# Patient Record
Sex: MALE | Race: WHITE | NOT HISPANIC OR LATINO | Employment: OTHER | URBAN - METROPOLITAN AREA
[De-identification: names, ages, dates, MRNs, and addresses within clinical notes are randomized per-mention and may not be internally consistent; named-entity substitution may affect disease eponyms.]

---

## 2019-08-14 ENCOUNTER — OFFICE VISIT (OUTPATIENT)
Dept: NEUROSURGERY | Facility: CLINIC | Age: 69
End: 2019-08-14

## 2019-08-14 VITALS
WEIGHT: 315 LBS | DIASTOLIC BLOOD PRESSURE: 80 MMHG | RESPIRATION RATE: 20 BRPM | HEART RATE: 80 BPM | SYSTOLIC BLOOD PRESSURE: 140 MMHG | HEIGHT: 77 IN | BODY MASS INDEX: 37.19 KG/M2

## 2019-08-14 DIAGNOSIS — M79.604 LEG PAIN, ANTERIOR, RIGHT: Primary | ICD-10-CM

## 2019-08-14 PROCEDURE — 99204 OFFICE O/P NEW MOD 45 MIN: CPT | Performed by: NEUROLOGICAL SURGERY

## 2019-08-14 RX ORDER — VENLAFAXINE HYDROCHLORIDE 75 MG/1
CAPSULE, EXTENDED RELEASE ORAL
COMMUNITY
Start: 2019-08-13

## 2019-08-14 RX ORDER — SIMVASTATIN 40 MG
TABLET ORAL
COMMUNITY
Start: 2019-06-04

## 2019-08-14 RX ORDER — NIACIN 500 MG
500 TABLET ORAL NIGHTLY
COMMUNITY

## 2019-08-14 RX ORDER — GLIPIZIDE 5 MG/1
TABLET, FILM COATED, EXTENDED RELEASE ORAL
COMMUNITY
Start: 2019-06-22

## 2019-08-14 RX ORDER — GABAPENTIN 300 MG/1
CAPSULE ORAL
Qty: 90 CAPSULE | Refills: 3 | Status: SHIPPED | OUTPATIENT
Start: 2019-08-14

## 2019-08-14 RX ORDER — ASPIRIN 81 MG/1
81 TABLET ORAL DAILY
COMMUNITY

## 2019-08-14 RX ORDER — BUSPIRONE HYDROCHLORIDE 15 MG/1
15 TABLET ORAL 2 TIMES DAILY
COMMUNITY

## 2019-08-14 RX ORDER — LOSARTAN POTASSIUM 25 MG/1
TABLET ORAL
COMMUNITY
Start: 2019-06-18

## 2019-08-14 RX ORDER — CARVEDILOL 6.25 MG/1
TABLET ORAL
COMMUNITY
Start: 2019-08-05

## 2019-08-14 RX ORDER — CLOPIDOGREL BISULFATE 75 MG/1
75 TABLET ORAL DAILY
COMMUNITY

## 2019-08-14 RX ORDER — METFORMIN HYDROCHLORIDE 500 MG/1
TABLET, EXTENDED RELEASE ORAL
COMMUNITY
Start: 2019-07-15

## 2019-08-14 NOTE — PROGRESS NOTES
Subjective   Patient ID: Jake Patel is a 68 y.o. male is being seen for consultation today at the request of Winston Lawrence MD for lumbar pain.  Pt is accompanied by his wife.    History of Present Illness 68 y fellow with a history of RLE pain excerbated by standing.  His pain starts around the knee and continues to the calf.  He reports mild LBP.  He reports exercise aggravates to some degree.  He takes tylenol 3 1x per day.  He denies weakness or falls.  No loss of b/b.  No left leg complaints.  He does has CAD and is on dual AP agents. He had a vascular  Evaluation of the legs a few years back and this was normal.  He denies JUAN's.  He has not trialed gabapentin.  He is a no smoker.  He reports his leg pain is as bad as a 6/10.   He failed chiropractic manipulation without assist.      The following portions of the patient's history were reviewed and updated as appropriate: allergies, current medications, past family history, past medical history, past social history, past surgical history and problem list.    Review of Systems   Constitutional: Negative for chills and fever.   HENT: Negative for ear pain and tinnitus.    Eyes: Negative for pain and visual disturbance.   Respiratory: Negative for shortness of breath.    Cardiovascular: Negative for palpitations.   Gastrointestinal: Negative for abdominal pain and nausea.   Genitourinary: Negative for difficulty urinating and enuresis.   Musculoskeletal: Negative for back pain (R leg to ankle).   Skin: Negative for rash.   Neurological: Negative for weakness and numbness.   Psychiatric/Behavioral: Negative for sleep disturbance.       Objective   Physical Exam   Constitutional: He is oriented to person, place, and time.   Neurological: He is oriented to person, place, and time. Gait normal.   Reflex Scores:       Tricep reflexes are 2+ on the right side and 2+ on the left side.       Bicep reflexes are 2+ on the right side and 2+ on the left side.        Brachioradialis reflexes are 2+ on the right side and 2+ on the left side.       Patellar reflexes are 2+ on the right side and 2+ on the left side.       Achilles reflexes are 1+ on the right side and 1+ on the left side.    Neurologic Exam     Mental Status   Oriented to person, place, and time.     Motor Exam   Muscle bulk: normal  Overall muscle tone: normal    Sensory Exam   Right arm light touch: normal  Left arm light touch: normal  Right leg light touch: normal  Left leg light touch: normal  Right arm pinprick: normal  Left arm pinprick: normal  Right leg pinprick: normal  Left leg pinprick: normal    Gait, Coordination, and Reflexes     Gait  Gait: normal    Reflexes   Right brachioradialis: 2+  Left brachioradialis: 2+  Right biceps: 2+  Left biceps: 2+  Right triceps: 2+  Left triceps: 2+  Right patellar: 2+  Left patellar: 2+  Right achilles: 1+  Left achilles: 1+  Right Mobley: absent  Left Mobley: absent  Right ankle clonus: absent  Left ankle clonus: absentBounding pulses in LE     No tinels at fibular head            Assessment/Plan   Independent Review of Radiographic Studies:  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  He has congenitally small spinal canal.      Medical Decision Making:  He has an interesting presentation - his pain STARTS around the knee.  He may have a peripheral process and I suggested an EMG to better vet this.  His back pathology could certainly cause this as well but the skipping of the buttock is interesting.  I will order an MRI of the knee with and without to look for a tumor.  We will start gabapentin as well.  He is leaving town and we will see him after.      Jake was seen today for back pain.    Diagnoses and all orders for this visit:    Leg pain, anterior, right  -     MRI knee right w wo contrast; Future    Other orders  -     gabapentin (NEURONTIN) 300 MG capsule; Take 1 qhs for 3-5 days, then bid for 3-5 days, then tid and stay on this dose      No  Follow-up on file.

## 2019-08-19 ENCOUNTER — TELEPHONE (OUTPATIENT)
Dept: NEUROSURGERY | Facility: CLINIC | Age: 69
End: 2019-08-19

## 2019-08-23 ENCOUNTER — TELEPHONE (OUTPATIENT)
Dept: NEUROSURGERY | Facility: CLINIC | Age: 69
End: 2019-08-23

## 2019-08-23 DIAGNOSIS — M54.16 LUMBAR RADICULOPATHY: Primary | ICD-10-CM

## 2019-08-23 NOTE — TELEPHONE ENCOUNTER
[place] [facility] [place] pain.  He was started on Gabapentin 300mg tid and it is not touching his pain.  Wants something else for his pain.  [phone]

## 2019-08-26 NOTE — TELEPHONE ENCOUNTER
Pt called Friday and has not heard anything about his pain medication.  Pt is calling today because he is needing some pain medication.   Please advise

## 2019-08-26 NOTE — TELEPHONE ENCOUNTER
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  He has congenitally small spinal canal.  Gabapentin not helping.  What to do?

## 2019-08-26 NOTE — TELEPHONE ENCOUNTER
I think the easiest thing to do for the time being is to refer him to pain management if the gabapentin is not helping.  He will need to be transitioned to 1 of the MDs upstairs for lumbar radiculopathy.

## 2019-08-28 DIAGNOSIS — M79.604 LEG PAIN, ANTERIOR, RIGHT: ICD-10-CM

## 2019-09-04 NOTE — PROGRESS NOTES
Subjective   Patient ID: aJke Patel is a 68 y.o. male is here today for follow-up. He is a former patient of Dr. Gee. He was last seen 8/14/2019 for pain in his lateral right lower extremity.  It only occurs when he is up on it for a period of time.  He has no back pain or proximal leg pain.  There is no numbness or tingling certainly no weakness.  He describes the pain is a dagger when it hits and he points to the area near the fibular head or slightly below.  Though touching the fibular head does not irritate him.    Leg Pain    There was no injury mechanism. The pain is present in the right knee and right ankle. The quality of the pain is described as stabbing. The pain is at a severity of 7/10. The pain is moderate. The pain has been intermittent since onset. Pertinent negatives include no muscle weakness, numbness or tingling. The symptoms are aggravated by weight bearing. Treatments tried: Gabapentin. The treatment provided no relief.       The following portions of the patient's history were reviewed and updated as appropriate: allergies, current medications, past family history, past medical history, past social history, past surgical history and problem list.    Review of Systems   Constitutional: Negative.    HENT: Negative.    Eyes: Negative.    Respiratory: Negative.  Negative for chest tightness and shortness of breath.    Cardiovascular: Negative.  Negative for chest pain.   Gastrointestinal: Negative.    Endocrine: Negative.    Genitourinary: Negative.    Musculoskeletal: Negative.         Right leg pain from knee to ankle   Skin: Negative.    Allergic/Immunologic: Negative.    Neurological: Negative.  Negative for tingling and numbness.   Hematological: Negative.    Psychiatric/Behavioral: Negative.    All other systems reviewed and are negative.      Objective   Physical Exam  Neurologic Exam     Physical Exam:    CONSTITUTIONAL: This 68 year old right handed  male appears well  developed, well-nourished and in no acute distress.    HEAD & FACE: the head and face are symmetric, normocephalic and atraumatic.    EYES: Inspection of the conjunctivae and lids reveals no swelling, erythema or discharge.  Pupils are round, equal and reactive to light and there is no scleral icterus.    EARS, NOSE, MOUTH & THROAT: On inspection, the ears and nose are within normal limits.    NECK: the neck is supple and symmetric. The trachea is midline with no masses.    PULMONARY: Respiratory effort is normal with no increased work of breathing or signs of respiratory distress.    CARDIOVASCULAR: Pedal pulses are +2/4 bilaterally. Examination of the extremities shows no edema or varicosities.    LYMPHATIC: There is no palpable lymphadenopathy of the neck.    MUSCULOSKELETAL: Gait and station are within normal limits. The spine has normal alignment and range of motion, Tinel sign negative at the fibular head.  No palpable masses of the right lower leg.    SKIN: The skin is warm, dry and intact    NEUROLOGIC:   Cranial Nerves 2-12 intact  Normal motor strength noted. Muscle bulk and tone are normal.  Sensory exam is normal to all modalities.  Reflexes on the right side demonstrates 2/4 Triceps Reflex, 2/4 Biceps Reflex, 2/4 Brachioradialis Reflex, 2/4 Knee Jerk Reflex, 1/4 Ankle Jerk Reflex and no ankle clonus on the right.   Reflexes on the left side demonstrates 2/4 Triceps Reflex, 2/4 Biceps Reflex, 2/4 Brachioradialis Reflex, 2/4 Knee Jerk Reflex, 1/4 Ankle Jerk Reflex and no ankle clonus on the left.  Superficial/Primitive Reflexes: primitive reflexes were absent.  No Mobley's, Babinski or clonus  No coordination deficit observed.  Radicular testing showed a negative Damian (LAKSHMI) test and negative straight leg raise.  Cortical function is intact and without deficits. Speech is normal.    PSYCHIATRIC: oriented to person, place and time. Patient's mood and affect are normal.    Assessment/Plan   Independent  "Review of Radiographic Studies:      MRI of the right knee done in [facility] on 8/27/2019 which was ordered by Dr. Kt Gee and had not been followed up yet revealed no acute osseous findings and no significant internal derangement of the knee.  No arthritis noted.  No evidence of discrete bone lesion or periosteal lesion.  From Dr. Gee's perspective there is no nerve tumor mentioned.    I personally reviewed the images from the following radiographic studies.    MRI of the lumbar spine done on 8/1/2019 at [facility] not available for my review shows congenital narrowing throughout the lumbar spine due to short pedicles.  Denerative disc disease is also noted and is most significant at L3-4 with degenerative endplate changes.  However, there is only mild central stenosis from L2-S1 and no significant neuroforaminal narrowing.    There are several notes in the chart suggesting Dr. Gee was concerned about a right sided L4-L5 \"excresence\" c/w a synovial cyst but the report does not mention it although to my eye there is a very tiny cyst on the right at L4L5 facet which does not cause neuroforaminal or spinal stenosis, only minimal disc bulge.    Medical Decision Making:      I been asked to assume Mr. Patel's neurosurgical evaluation in Dr. Gee's absence as he had sent him for some additional studies.  I have to be honest of never been asked to review the MRI of the knee as part of the neurosurgical work-up and the patient does have significant degenerative change in the knee but no nerve tumor which is what Dr. Gee was looking for.  Also reviewing the MRI of the lumbar spine there is no severe stenosis at any level despite having a congenital narrowing overall superimposed upon degenerative change.    I do not see a neurosurgical source for the pattern of pain below the knee in this man's right leg.  The raising test Damian signs are negative there is no Tinel sign at the knee.  Dr." Gerard reports that he had a prior vascular work-up in the leg for this pattern of pain which was negative.  He does have degenerative change in his knee so the only other options to search for source would be through orthopedics or general neurology.  Since my neurologic exam is normal I think that orthopedics may be the next best option.  The patient and I seem to be localizing the source to the knee or lateral lower leg.  He should be able to see an orthopedist in his local area through his primary physician.    Jake was seen today for leg pain.    Diagnoses and all orders for this visit:    Leg pain, anterior, right  -     Ambulatory Referral to Orthopedic Surgery    Degenerative disc disease, lumbar      No Follow-up on file.

## 2019-09-06 ENCOUNTER — OFFICE VISIT (OUTPATIENT)
Dept: NEUROSURGERY | Facility: CLINIC | Age: 69
End: 2019-09-06

## 2019-09-06 VITALS
SYSTOLIC BLOOD PRESSURE: 160 MMHG | WEIGHT: 315 LBS | DIASTOLIC BLOOD PRESSURE: 95 MMHG | BODY MASS INDEX: 37.19 KG/M2 | HEIGHT: 77 IN | HEART RATE: 83 BPM

## 2019-09-06 DIAGNOSIS — M79.604 LEG PAIN, ANTERIOR, RIGHT: Primary | ICD-10-CM

## 2019-09-06 DIAGNOSIS — M51.36 DEGENERATIVE DISC DISEASE, LUMBAR: ICD-10-CM

## 2019-09-06 PROCEDURE — 99213 OFFICE O/P EST LOW 20 MIN: CPT | Performed by: NEUROLOGICAL SURGERY

## 2019-09-13 ENCOUNTER — TELEPHONE (OUTPATIENT)
Dept: NEUROSURGERY | Facility: CLINIC | Age: 69
End: 2019-09-13

## 2019-09-13 NOTE — TELEPHONE ENCOUNTER
Pt has an order for pain management that Becka put in on 8/26.     Pt saw Dr Bangura on 9/6 and he put in an order for pt to see an orthopedic doctor. Pt lives in [place].      Pt wants us to get together and figure out if he needs pain management or ortho and then call Dr Lawrence to see who he recommends.     Please advise

## 2019-09-16 NOTE — TELEPHONE ENCOUNTER
Per my previous response on 9/13/19, the pt is suppose to see Dr. Lawrence to be referred to an Orthopedist. You can see from Dr. Bangura's last office vist on  9/6/19:   The patient and I seem to be localizing the source to the knee or lateral lower leg.  He should be able to see an orthopedist in his local area through his primary physician. Would you like for me to contact the pt ?

## 2019-09-16 NOTE — TELEPHONE ENCOUNTER
Spoke with pt and he's waiting to see Dr Lawrence, and that Dr. Lawrence will be referring him to Ortho in [place].

## 2019-09-16 NOTE — TELEPHONE ENCOUNTER
If Dr. Bangura wanted the patient to get the referral from Dr. Lawrence why is there a referral in the system from Dr. Bangura to Orthopedic surgery? If we have a referral in the system we should schedule it or if we are not going to schedule it then yes there should be a telephone note saying why and notifying the patient. Then the referral should be cancelled.      I apologize- I didn't see where Dr. Bangura said he wanted the patient to get the referral from Dr. Lawrence. I see the referral that he made and the patient keeps calling asking for the appt.

## 2019-09-16 NOTE — TELEPHONE ENCOUNTER
My understanding is that you schedule Dr. Bangura's outgoing appointments. Is there some reason this patients appointments have not been scheduled? He keeps calling here.